# Patient Record
Sex: MALE | Race: WHITE | Employment: OTHER | URBAN - METROPOLITAN AREA
[De-identification: names, ages, dates, MRNs, and addresses within clinical notes are randomized per-mention and may not be internally consistent; named-entity substitution may affect disease eponyms.]

---

## 2023-04-27 ENCOUNTER — PREP FOR PROCEDURE (OUTPATIENT)
Dept: OPHTHALMOLOGY | Age: 83
End: 2023-04-27

## 2023-04-27 ENCOUNTER — HOSPITAL ENCOUNTER (OUTPATIENT)
Dept: SURGERY | Age: 83
Discharge: HOME | End: 2023-04-27
Payer: MEDICARE

## 2023-04-27 VITALS — DIASTOLIC BLOOD PRESSURE: 96 MMHG | SYSTOLIC BLOOD PRESSURE: 147 MMHG

## 2023-04-27 PROCEDURE — 66821 AFTER CATARACT LASER SURGERY: CPT

## 2023-04-27 PROCEDURE — 2500000003 HC RX 250 WO HCPCS: Performed by: OPHTHALMOLOGY

## 2023-04-27 PROCEDURE — 6370000000 HC RX 637 (ALT 250 FOR IP): Performed by: OPHTHALMOLOGY

## 2023-04-27 RX ORDER — PHENYLEPHRINE HCL 2.5 %
1 DROPS OPHTHALMIC (EYE) ONCE
Status: COMPLETED | OUTPATIENT
Start: 2023-04-27 | End: 2023-04-27

## 2023-04-27 RX ORDER — PROPARACAINE HYDROCHLORIDE 5 MG/ML
1 SOLUTION/ DROPS OPHTHALMIC ONCE
Status: COMPLETED | OUTPATIENT
Start: 2023-04-27 | End: 2023-04-27

## 2023-04-27 RX ORDER — PHENYLEPHRINE HCL 2.5 %
1 DROPS OPHTHALMIC (EYE) ONCE
Status: CANCELLED | OUTPATIENT
Start: 2023-04-27 | End: 2023-04-27

## 2023-04-27 RX ORDER — TROPICAMIDE 10 MG/ML
1 SOLUTION/ DROPS OPHTHALMIC ONCE
Status: CANCELLED | OUTPATIENT
Start: 2023-04-27 | End: 2023-04-27

## 2023-04-27 RX ORDER — TROPICAMIDE 10 MG/ML
1 SOLUTION/ DROPS OPHTHALMIC ONCE
Status: COMPLETED | OUTPATIENT
Start: 2023-04-27 | End: 2023-04-27

## 2023-04-27 RX ORDER — PROPARACAINE HYDROCHLORIDE 5 MG/ML
1 SOLUTION/ DROPS OPHTHALMIC ONCE
Status: CANCELLED | OUTPATIENT
Start: 2023-04-27 | End: 2023-04-27

## 2023-04-27 RX ADMIN — PROPARACAINE HYDROCHLORIDE 1 DROP: 5 SOLUTION/ DROPS OPHTHALMIC at 10:58

## 2023-04-27 RX ADMIN — PHENYLEPHRINE HYDROCHLORIDE 1 DROP: 25 SOLUTION/ DROPS OPHTHALMIC at 10:58

## 2023-04-27 RX ADMIN — TROPICAMIDE 1 DROP: 10 SOLUTION/ DROPS OPHTHALMIC at 10:58

## 2023-04-27 NOTE — PROGRESS NOTES
Patient: Catalina Spaulding  1940, 83 y.o./male    Ambulatory to laser room. Right eye marked and numbed by Dr. Kerry Whitman. Laser procedure done and tolerated well by patient. Post  instructions given to [facility] by provider.       Power setting was 1.5 mJ         # of shots was 14    Total power was 21.0 mJ    Markus Ayon RN

## 2023-04-27 NOTE — OP NOTE
Jacinda Lines    OPERATIVE NOTE    Preoperative Diagnosis: Posterior Capsular Opacification the right eye    Postoperative Diagnosis: Posterior Capsular Opacification the right eye    Procedure: YAG posterior capsulotomy the right eye    Surgeon: Mehrdad Manzo MD.    Anesthesia: Topical    Complications: none    Specimens: none    Indications for procedure: The patient is a 81y.o. year old who is status post cataract extraction with intraocular lens implantation who complained of increasing glare and blurry vision. Visually significant posterior capsular opacification was noted on examination. Risks, benefits, and alternatives to surgery were discussed with the patient and the patient elected to proceed with YAG laser posterior capsulotomy of the the right eye. Details of the procedure: The patient was brought the laser room. The patient had topical proparacaine drops instilled. The patient was positioned at the YAG laser where 14 shots were administered at 1.5 millijoules in a cruciate pattern. The patient tolerated the procedure well. The patient will follow up as an outpatient as scheduled.